# Patient Record
Sex: MALE | Race: WHITE
[De-identification: names, ages, dates, MRNs, and addresses within clinical notes are randomized per-mention and may not be internally consistent; named-entity substitution may affect disease eponyms.]

---

## 2022-08-19 ENCOUNTER — HOSPITAL ENCOUNTER (EMERGENCY)
Dept: HOSPITAL 54 - ER | Age: 42
LOS: 3 days | Discharge: LEFT BEFORE DISCHARGE | End: 2022-08-22
Payer: COMMERCIAL

## 2022-08-19 VITALS — BODY MASS INDEX: 27.09 KG/M2 | HEIGHT: 72 IN | WEIGHT: 200 LBS

## 2022-08-19 DIAGNOSIS — R45.851: Primary | ICD-10-CM

## 2022-08-19 DIAGNOSIS — F29: ICD-10-CM

## 2022-08-19 DIAGNOSIS — F32.A: ICD-10-CM

## 2022-08-19 DIAGNOSIS — Z20.822: ICD-10-CM

## 2022-08-19 LAB
ALBUMIN SERPL BCP-MCNC: 4.3 G/DL (ref 3.4–5)
ALP SERPL-CCNC: 85 U/L (ref 46–116)
ALT SERPL W P-5'-P-CCNC: 33 U/L (ref 12–78)
APAP SERPL-MCNC: 0 UG/ML (ref 10–30)
AST SERPL W P-5'-P-CCNC: 28 U/L (ref 15–37)
BASOPHILS # BLD AUTO: 0 K/UL (ref 0–0.2)
BASOPHILS NFR BLD AUTO: 0.1 % (ref 0–2)
BILIRUB DIRECT SERPL-MCNC: 0.2 MG/DL (ref 0–0.2)
BILIRUB SERPL-MCNC: 0.6 MG/DL (ref 0.2–1)
BILIRUB UR QL STRIP: NEGATIVE
BUN SERPL-MCNC: 16 MG/DL (ref 7–18)
CALCIUM SERPL-MCNC: 9.7 MG/DL (ref 8.5–10.1)
CHLORIDE SERPL-SCNC: 105 MMOL/L (ref 98–107)
CO2 SERPL-SCNC: 31 MMOL/L (ref 21–32)
COLOR UR: YELLOW
CREAT SERPL-MCNC: 1.4 MG/DL (ref 0.6–1.3)
EOSINOPHIL NFR BLD AUTO: 0.3 % (ref 0–6)
ETHANOL SERPL-MCNC: < 3 MG/DL (ref 0–0)
GLUCOSE SERPL-MCNC: 111 MG/DL (ref 74–106)
GLUCOSE UR STRIP-MCNC: NEGATIVE MG/DL
HCT VFR BLD AUTO: 45 % (ref 39–51)
HGB BLD-MCNC: 14.6 G/DL (ref 13.5–17.5)
LEUKOCYTE ESTERASE UR QL STRIP: NEGATIVE
LYMPHOCYTES NFR BLD AUTO: 1.4 K/UL (ref 0.8–4.8)
LYMPHOCYTES NFR BLD AUTO: 19.8 % (ref 20–44)
MCHC RBC AUTO-ENTMCNC: 33 G/DL (ref 31–36)
MCV RBC AUTO: 89 FL (ref 80–96)
MONOCYTES NFR BLD AUTO: 0.7 K/UL (ref 0.1–1.3)
MONOCYTES NFR BLD AUTO: 9.7 % (ref 2–12)
NEUTROPHILS # BLD AUTO: 4.9 K/UL (ref 1.8–8.9)
NEUTROPHILS NFR BLD AUTO: 70.1 % (ref 43–81)
NITRITE UR QL STRIP: NEGATIVE
PH UR STRIP: 6.5 [PH] (ref 5–8)
PLATELET # BLD AUTO: 193 K/UL (ref 150–450)
POTASSIUM SERPL-SCNC: 4 MMOL/L (ref 3.5–5.1)
PROT SERPL-MCNC: 8.7 G/DL (ref 6.4–8.2)
PROT UR QL STRIP: 30 MG/DL
RBC # BLD AUTO: 5.01 MIL/UL (ref 4.5–6)
RBC #/AREA URNS HPF: (no result) /HPF (ref 0–2)
SODIUM SERPL-SCNC: 143 MMOL/L (ref 136–145)
UROBILINOGEN UR STRIP-MCNC: 0.2 EU/DL
WBC #/AREA URNS HPF: (no result) /HPF (ref 0–3)
WBC NRBC COR # BLD AUTO: 7.1 K/UL (ref 4.3–11)

## 2022-08-19 PROCEDURE — 80048 BASIC METABOLIC PNL TOTAL CA: CPT

## 2022-08-19 PROCEDURE — G0480 DRUG TEST DEF 1-7 CLASSES: HCPCS

## 2022-08-19 PROCEDURE — 80307 DRUG TEST PRSMV CHEM ANLYZR: CPT

## 2022-08-19 PROCEDURE — 96372 THER/PROPH/DIAG INJ SC/IM: CPT

## 2022-08-19 PROCEDURE — 99285 EMERGENCY DEPT VISIT HI MDM: CPT

## 2022-08-19 PROCEDURE — 36415 COLL VENOUS BLD VENIPUNCTURE: CPT

## 2022-08-19 PROCEDURE — 85025 COMPLETE CBC W/AUTO DIFF WBC: CPT

## 2022-08-19 PROCEDURE — 80143 DRUG ASSAY ACETAMINOPHEN: CPT

## 2022-08-19 PROCEDURE — 80076 HEPATIC FUNCTION PANEL: CPT

## 2022-08-19 PROCEDURE — 80320 DRUG SCREEN QUANTALCOHOLS: CPT

## 2022-08-19 PROCEDURE — 81001 URINALYSIS AUTO W/SCOPE: CPT

## 2022-08-19 PROCEDURE — 87426 SARSCOV CORONAVIRUS AG IA: CPT

## 2022-08-19 PROCEDURE — C9803 HOPD COVID-19 SPEC COLLECT: HCPCS

## 2022-08-19 NOTE — NUR
[id number]. FOUND LEANING OVER ESCALATOR AT [facility]. ON HOLD FOR DTS. PT 
DOES NOT TALK. DENIED SI. PLACED ON BED, ASLEEP NOT RESPONDING TO QUESTION- 
VERBAL STIMULI, BREATHING EVEN AND UNLABORED.

## 2022-08-19 NOTE — NUR
PATIENT IS COOPERATIVE WITH  AND SECURITY FOLLOWS COMMAND, RESPONDS TO 
VERBAL STIMULI BY SIGN LANGUAGE.

## 2022-08-20 NOTE — NUR
PT IS SLEEPING, EASILY AROUSABLE. DENIES ANY PAIN AT THIS TIME. CONNECTED TO 
MONITOR. SITTER WITHIN SIGHT. WILL CONTINUE TO MONITOR.

## 2022-08-20 NOTE — NUR
Pt starting to get anxious/agitated- Dr Vogt notified w/orders for zyprexa. 
Given as indicated. Sitter at direct line of sight for safety

## 2022-08-21 NOTE — NUR
Awake, Alert. Uncooperative and refusing to give any information at this time. 
Awaiting psych placement

## 2022-08-21 NOTE — NUR
PT IS AWAKE , WATCHING TV. REFUSES TO ANSWER ANY QUESTIONS. PT DOES NOT SEEM TO 
BE IN DISTRESS. CONNECTED TO MONITOR. SITTER WITHIN SIGHT. WILL CONTINUE TO 
MONITOR.

## 2022-08-21 NOTE — NUR
ASSUMED CARE. PT AWAKE, REFUSING TO SAY HIS NAME. I ASKED WHERE HE IS RIGHT NOW 
" YES, THE NAME ON YOUR JACKET ". I'M WEARING [facility] JACKET. I 
ASKED THE DATE RIGHT NOW, " I DON'T WANT TO SAY ANY NUMBERS RIGHT NOW ". RR 
EVEN & UNLABORED. WILL CONT TO MONITOR. PT REFUSED TO CHECK HIS VITAL SIGNS.

## 2022-08-22 VITALS — SYSTOLIC BLOOD PRESSURE: 145 MMHG | DIASTOLIC BLOOD PRESSURE: 90 MMHG

## 2022-08-22 NOTE — NUR
MARIAN faxed clinicals to [facility] TEL:[phone] fax:[phone] for placement 
at [facility] [[facility] [place] [address]] for voluntary psychiatric treatment as requested by crisis clinician, 
Brandon normal balance

## 2022-08-22 NOTE — NUR
DC Planning: 



MARIAN faxed clinicals to [facility] fax:[phone] 
tel:[phone] and pt. was clinically accepted but pending bed availability. 
However, patient's hold will  tonight. 



MARIAN discussed case with SS Director Bhakti Nguyễn who stated she will have crisis 
clinician, Scott Phillip [phone] will come and see the pt. soon to determine 
if pt. needs to continue treatment.

## 2022-08-22 NOTE — NUR
SS consult requested for suicidal ideation and placement. Pt. is a 41-year-old 
male who was admitted on 08/19/2022 due to behavioral/overdose. Upon SS 
consult, pt. presents with a depressed mood and congruent affect. Pt. presents 
with an illogical thought process. Pt. does not provide appropriate eye 
contact. Pt. appeared to have difficulty processing what the discharge planner 
was communicating with the pt. Pt. closed his eyes throughout the interview. 
Discharge planner asked the pt. if they would be willing to go to [facility]. Discharge planner explained the facility to the patient. Pt. closed his 
eyes and did not respond. Discharge planer will remain available as needed.